# Patient Record
Sex: MALE | Race: ASIAN
[De-identification: names, ages, dates, MRNs, and addresses within clinical notes are randomized per-mention and may not be internally consistent; named-entity substitution may affect disease eponyms.]

---

## 2022-12-08 ENCOUNTER — NON-APPOINTMENT (OUTPATIENT)
Age: 62
End: 2022-12-08

## 2023-01-24 PROBLEM — Z00.00 ENCOUNTER FOR PREVENTIVE HEALTH EXAMINATION: Status: ACTIVE | Noted: 2023-01-24

## 2023-01-27 ENCOUNTER — APPOINTMENT (OUTPATIENT)
Dept: OTOLARYNGOLOGY | Facility: CLINIC | Age: 63
End: 2023-01-27
Payer: COMMERCIAL

## 2023-01-27 VITALS
HEART RATE: 68 BPM | HEIGHT: 70 IN | TEMPERATURE: 96.9 F | SYSTOLIC BLOOD PRESSURE: 153 MMHG | WEIGHT: 217 LBS | BODY MASS INDEX: 31.07 KG/M2 | DIASTOLIC BLOOD PRESSURE: 79 MMHG

## 2023-01-27 DIAGNOSIS — Z82.49 FAMILY HISTORY OF ISCHEMIC HEART DISEASE AND OTHER DISEASES OF THE CIRCULATORY SYSTEM: ICD-10-CM

## 2023-01-27 DIAGNOSIS — Z78.9 OTHER SPECIFIED HEALTH STATUS: ICD-10-CM

## 2023-01-27 DIAGNOSIS — Z87.39 PERSONAL HISTORY OF OTHER DISEASES OF THE MUSCULOSKELETAL SYSTEM AND CONNECTIVE TISSUE: ICD-10-CM

## 2023-01-27 DIAGNOSIS — K21.00 GASTRO-ESOPHAGEAL REFLUX DISEASE WITH ESOPHAGITIS, WITHOUT BLEEDING: ICD-10-CM

## 2023-01-27 DIAGNOSIS — R06.83 SNORING: ICD-10-CM

## 2023-01-27 DIAGNOSIS — Z86.79 PERSONAL HISTORY OF OTHER DISEASES OF THE CIRCULATORY SYSTEM: ICD-10-CM

## 2023-01-27 DIAGNOSIS — R73.03 PREDIABETES.: ICD-10-CM

## 2023-01-27 DIAGNOSIS — Z86.39 PERSONAL HISTORY OF OTHER ENDOCRINE, NUTRITIONAL AND METABOLIC DISEASE: ICD-10-CM

## 2023-01-27 DIAGNOSIS — N20.0 CALCULUS OF KIDNEY: ICD-10-CM

## 2023-01-27 PROCEDURE — 99203 OFFICE O/P NEW LOW 30 MIN: CPT | Mod: 25

## 2023-01-27 PROCEDURE — 31575 DIAGNOSTIC LARYNGOSCOPY: CPT

## 2023-01-27 RX ORDER — METFORMIN HYDROCHLORIDE 1000 MG/1
1000 TABLET, COATED ORAL TWICE DAILY
Refills: 0 | Status: ACTIVE | COMMUNITY

## 2023-01-27 RX ORDER — METOPROLOL TARTRATE 50 MG/1
50 TABLET, FILM COATED ORAL
Refills: 0 | Status: ACTIVE | COMMUNITY

## 2023-01-27 RX ORDER — POTASSIUM CITRATE 15 MEQ/1
15 MEQ TABLET, EXTENDED RELEASE ORAL TWICE DAILY
Refills: 0 | Status: ACTIVE | COMMUNITY

## 2023-01-27 RX ORDER — ALLOPURINOL 100 MG/1
100 TABLET ORAL DAILY
Refills: 0 | Status: ACTIVE | COMMUNITY

## 2023-01-27 RX ORDER — SIMVASTATIN 20 MG/1
20 TABLET, FILM COATED ORAL
Refills: 0 | Status: ACTIVE | COMMUNITY

## 2023-01-27 RX ORDER — VALSARTAN 320 MG/1
320 TABLET, COATED ORAL DAILY
Refills: 0 | Status: ACTIVE | COMMUNITY

## 2023-01-27 RX ORDER — ASPIRIN 81 MG
81 TABLET, DELAYED RELEASE (ENTERIC COATED) ORAL DAILY
Refills: 0 | Status: ACTIVE | COMMUNITY

## 2023-01-27 NOTE — REVIEW OF SYSTEMS
[Sleep Disturbances] : sleep disturbances [Negative] : Heme/Lymph [FreeTextEntry8] : recurrent kidney stones

## 2023-01-27 NOTE — HISTORY OF PRESENT ILLNESS
[de-identified] : Mr. BARRIENTOS is a 62 year old man who was referred by Dr. Oliveira for snoring and possible ALEX.\par PMH:  HTN, HLD, borderline DM, gout, recurrent kidney stones.\par \par Loud snoring, observed apneas and choking observed by his wife x years.  No daytime sleepiness since he started exercising.\par Wakes around 3 am and can't fall back asleep for several hours most of time.  He thinks that reflux may be waking him from sleep.\par Told that his BP may be related to ALEX.  Did not take his BP meds today\par No nasal congestion\par Trying to lose weight.\par Son is using CPAP for ALEX; his HTN was related to the ALEX.\par

## 2023-01-27 NOTE — ASSESSMENT
[FreeTextEntry1] : Mr. BARRIENTOS is a 62 year old man who has chronic loud snoring, with witnessed apneas and choking, x years, consistent with obstructive sleep apnea.  The ALEX may be a factor in his high blood pressure.\par He wakes from sleep around 3 AM most nights -- thinks due to reflux but may be from the ALEX -- and cannot return to sleep for a few hours.\par \par PLAN:\par - sleep study ordered through [facility].  He will be contact next week for scheduling, after authorization is obtained.\par We briefly discussed PAP treatment for ALEX.\par - reflux precautions\par \par Return in 1 month.\par

## 2023-01-27 NOTE — CONSULT LETTER
[Dear  ___] : Dear  [unfilled], [( Thank you for referring [unfilled] for consultation for _____ )] : Thank you for referring [unfilled] for consultation for [unfilled] [Please see my note below.] : Please see my note below. [Consult Closing:] : Thank you very much for allowing me to participate in the care of this patient.  If you have any questions, please do not hesitate to contact me. [Sincerely,] : Sincerely, [FreeTextEntry2] : Rafael Oliveira M.D.\par [address], [address]\[place] [address]  [FreeTextEntry3] : \par Jazmine Martin MD \par Otolaryngology, Head and Neck Surgery \par \par

## 2023-01-27 NOTE — PHYSICAL EXAM
[] : septum deviated to the left [Midline] : trachea located in midline position [Laryngoscopy Performed] : laryngoscopy was performed, see procedure section for findings [Normal] : no rashes [FreeTextEntry1] : No hoarseness.  [de-identified] : Mallampati 4 airway. [de-identified] : 1+ bilateral  [de-identified] : Carotid pulses 2+ bilateral.

## 2023-01-27 NOTE — PROCEDURE
[de-identified] : \par Indication: reflux\par -Verbal consent was obtained from patient prior to procedure.\par -Singh-Synephrine spray applied to the nasal cavities.\par Flexible laryngoscopy was performed via right nostril and revealed the following:\par   -- Nasopharynx had no mass or exudate.\par   -- Base of tongue was symmetric\par   -- Vallecula was clear\par   -- Epiglottis, both aryepiglottic folds and both false vocal folds were normal\par   -- Arytenoids both with mild edema and erythema \par   -- True vocal folds were fully mobile and without lesions. \par   -- Post cricoid area was clear.\par   -- Interarytenoid edema was present.\par   -- No lesions seen in laryngopharynx\par   -- Unable to seal for modified Barbour maneuver\par  \par The patient tolerated the procedure well.\par

## 2023-02-14 ENCOUNTER — APPOINTMENT (OUTPATIENT)
Dept: SLEEP CENTER | Facility: HOSPITAL | Age: 63
End: 2023-02-14

## 2023-02-14 ENCOUNTER — OUTPATIENT (OUTPATIENT)
Dept: OUTPATIENT SERVICES | Facility: HOSPITAL | Age: 63
LOS: 1 days | End: 2023-02-14
Payer: COMMERCIAL

## 2023-02-14 DIAGNOSIS — G47.33 OBSTRUCTIVE SLEEP APNEA (ADULT) (PEDIATRIC): ICD-10-CM

## 2023-02-14 PROCEDURE — 95810 POLYSOM 6/> YRS 4/> PARAM: CPT

## 2023-02-14 PROCEDURE — 95810 POLYSOM 6/> YRS 4/> PARAM: CPT | Mod: 26

## 2023-03-20 ENCOUNTER — NON-APPOINTMENT (OUTPATIENT)
Age: 63
End: 2023-03-20

## 2024-03-25 ENCOUNTER — NON-APPOINTMENT (OUTPATIENT)
Age: 64
End: 2024-03-25

## 2024-03-26 ENCOUNTER — APPOINTMENT (OUTPATIENT)
Dept: PULMONOLOGY | Facility: CLINIC | Age: 64
End: 2024-03-26
Payer: COMMERCIAL

## 2024-03-26 VITALS
BODY MASS INDEX: 30.78 KG/M2 | OXYGEN SATURATION: 98 % | HEART RATE: 91 BPM | TEMPERATURE: 97.9 F | HEIGHT: 70 IN | WEIGHT: 215 LBS | DIASTOLIC BLOOD PRESSURE: 93 MMHG | SYSTOLIC BLOOD PRESSURE: 171 MMHG

## 2024-03-26 DIAGNOSIS — G47.33 OBSTRUCTIVE SLEEP APNEA (ADULT) (PEDIATRIC): ICD-10-CM

## 2024-03-26 PROCEDURE — 99204 OFFICE O/P NEW MOD 45 MIN: CPT

## 2024-03-27 PROBLEM — G47.33 OBSTRUCTIVE SLEEP APNEA: Status: ACTIVE | Noted: 2023-01-27

## 2024-03-27 NOTE — REVIEW OF SYSTEMS
[Snoring] : snoring [Witnessed Apneas] : witnessed apnea [A.M. Dry Mouth] : a.m. dry mouth [Difficulty Maintaining Sleep] : difficulty maintaining sleep [Negative] : Psychiatric [Difficulty Initiating Sleep] : no difficulty falling asleep [Lower Extremity Discomfort] : no lower extremity discomfort [Late day/ Evening symptoms] : no late day/evening symptoms [Hypersomnolence] : not sleeping much more than usual [Unusual Sleep Behavior] : no unusual sleep behavior [Cataplexy] :  no cataplexy

## 2024-03-27 NOTE — ASSESSMENT
[FreeTextEntry1] : REVIEWED PSG 2/14/2023: AHI 68.2 (AASM); AHI 66.8 (CMS); loud snoring; t88 42.3 minutes  The patient is a 62-year-old M with PHX of HTN, HLD, borderline DM, gout, referred by Dr. Martin to discuss treatment of severe ALEX. Notes snoring, witnessed apneas, poor sleep quality. The benefits of ALEX treatment in improving cardiac, neurologic, cognitive, and mortality outcomes were discussed. Treatment options including PAP, CN12 stimulation reviewed; not interested in inspire. Would like to proceed with APAP. Airsense 11 Auto, ordered today through [facility] with nasal mask w/ chin support. Pressure settings are between 5 cm H2O and 20 cm H2O. Patient's adherence goal is at least 4 hours per night on 70% of nights with an AHI of less than 10 events per hour. The ramifications of ALEX and its treatment were discussed in detail.  Follow-up 10 weeks after starting APAP.

## 2024-03-27 NOTE — CONSULT LETTER
[Dear  ___] : Dear  [unfilled], [Consult Letter:] : I had the pleasure of evaluating your patient, [unfilled]. [Please see my note below.] : Please see my note below. [Consult Closing:] : Thank you very much for allowing me to participate in the care of this patient.  If you have any questions, please do not hesitate to contact me. [FreeTextEntry3] : Makenzie Haddad MD  [employer] at [facility] Pulmonary, Critical Care, and Sleep Medicine [Sincerely,] : Sincerely, [DrRod  ___] : Dr. MCKEON

## 2024-03-27 NOTE — HISTORY OF PRESENT ILLNESS
[ESS] : 4 [FreeTextEntry1] : The patient is a 62-year-old M with PHX of HTN, HLD, borderline DM, gout, referred by Dr. Martin to discuss treatment of severe ALEX. Notes snoring, witnessed apneas, poor sleep quality. Notes daytime somnolence has improved since he started exercising x 1 year. Has lost 15-18 pounds since last year. Son has ALEX using PAP. Notes HTN has been hard to control.

## 2024-03-27 NOTE — PHYSICAL EXAM
[General Appearance - Well Developed] : well developed [General Appearance - Well Nourished] : well nourished [Exaggerated Use Of Accessory Muscles For Inspiration] : no accessory muscle use [] : no respiratory distress [Abnormal Walk] : normal gait [Oriented To Time, Place, And Person] : oriented to person, place, and time [Affect] : the affect was normal [Normal Conjunctiva] : the conjunctiva exhibited no abnormalities [Neck Appearance] : the appearance of the neck was normal

## 2024-03-27 NOTE — END OF VISIT
[FreeTextEntry3] :   I, Makenzie Haddad MD, personally performed the evaluation and management (E/M) services for this patient. That E/M includes conducting the clinically appropriate initial history &/or exam, assessing all conditions, and establishing the plan of care. I have also independently reviewed the medical records and imaging at the time of this office visit, and discussed the above mentioned images with interpretations with the patient. Today, HEIDY Bustillo was here to participate in the visit & follow plan of care established by me.

## 2024-05-08 ENCOUNTER — NON-APPOINTMENT (OUTPATIENT)
Age: 64
End: 2024-05-08

## 2024-07-08 ENCOUNTER — APPOINTMENT (OUTPATIENT)
Dept: PULMONOLOGY | Facility: CLINIC | Age: 64
End: 2024-07-08
Payer: COMMERCIAL

## 2024-07-08 VITALS
OXYGEN SATURATION: 100 % | HEART RATE: 67 BPM | WEIGHT: 220 LBS | TEMPERATURE: 98.4 F | HEIGHT: 70 IN | DIASTOLIC BLOOD PRESSURE: 77 MMHG | BODY MASS INDEX: 31.5 KG/M2 | SYSTOLIC BLOOD PRESSURE: 157 MMHG

## 2024-07-08 DIAGNOSIS — R06.83 SNORING: ICD-10-CM

## 2024-07-08 DIAGNOSIS — G47.33 OBSTRUCTIVE SLEEP APNEA (ADULT) (PEDIATRIC): ICD-10-CM

## 2024-07-08 PROCEDURE — 99214 OFFICE O/P EST MOD 30 MIN: CPT

## 2024-07-08 PROCEDURE — G2211 COMPLEX E/M VISIT ADD ON: CPT
